# Patient Record
Sex: MALE | Race: WHITE | NOT HISPANIC OR LATINO | URBAN - METROPOLITAN AREA
[De-identification: names, ages, dates, MRNs, and addresses within clinical notes are randomized per-mention and may not be internally consistent; named-entity substitution may affect disease eponyms.]

---

## 2023-06-09 ENCOUNTER — EMERGENCY (EMERGENCY)
Facility: HOSPITAL | Age: 53
LOS: 1 days | Discharge: HOME | End: 2023-06-09
Attending: EMERGENCY MEDICINE | Admitting: EMERGENCY MEDICINE
Payer: COMMERCIAL

## 2023-06-09 VITALS
OXYGEN SATURATION: 100 % | HEART RATE: 125 BPM | WEIGHT: 235.01 LBS | HEIGHT: 68 IN | TEMPERATURE: 98 F | SYSTOLIC BLOOD PRESSURE: 130 MMHG | RESPIRATION RATE: 25 BRPM | DIASTOLIC BLOOD PRESSURE: 115 MMHG

## 2023-06-09 VITALS
RESPIRATION RATE: 19 BRPM | OXYGEN SATURATION: 98 % | HEART RATE: 85 BPM | SYSTOLIC BLOOD PRESSURE: 128 MMHG | TEMPERATURE: 99 F | DIASTOLIC BLOOD PRESSURE: 90 MMHG

## 2023-06-09 LAB
ANION GAP SERPL CALC-SCNC: 10 MMOL/L — SIGNIFICANT CHANGE UP (ref 5–17)
BUN SERPL-MCNC: 16 MG/DL — SIGNIFICANT CHANGE UP (ref 7–23)
CALCIUM SERPL-MCNC: 9.2 MG/DL — SIGNIFICANT CHANGE UP (ref 8.5–10.1)
CHLORIDE SERPL-SCNC: 108 MMOL/L — SIGNIFICANT CHANGE UP (ref 96–108)
CO2 SERPL-SCNC: 20 MMOL/L — LOW (ref 22–31)
CREAT SERPL-MCNC: 1.3 MG/DL — SIGNIFICANT CHANGE UP (ref 0.5–1.3)
EGFR: 66 ML/MIN/1.73M2 — SIGNIFICANT CHANGE UP
GLUCOSE SERPL-MCNC: 118 MG/DL — HIGH (ref 70–99)
HCT VFR BLD CALC: 48.7 % — SIGNIFICANT CHANGE UP (ref 39–50)
HGB BLD-MCNC: 16.4 G/DL — SIGNIFICANT CHANGE UP (ref 13–17)
MAGNESIUM SERPL-MCNC: 1.8 MG/DL — SIGNIFICANT CHANGE UP (ref 1.6–2.6)
MCHC RBC-ENTMCNC: 29.8 PG — SIGNIFICANT CHANGE UP (ref 27–34)
MCHC RBC-ENTMCNC: 33.7 GM/DL — SIGNIFICANT CHANGE UP (ref 32–36)
MCV RBC AUTO: 88.5 FL — SIGNIFICANT CHANGE UP (ref 80–100)
NRBC # BLD: 0 /100 WBCS — SIGNIFICANT CHANGE UP (ref 0–0)
PLATELET # BLD AUTO: 236 K/UL — SIGNIFICANT CHANGE UP (ref 150–400)
POTASSIUM SERPL-MCNC: 3.4 MMOL/L — LOW (ref 3.5–5.3)
POTASSIUM SERPL-SCNC: 3.4 MMOL/L — LOW (ref 3.5–5.3)
RBC # BLD: 5.5 M/UL — SIGNIFICANT CHANGE UP (ref 4.2–5.8)
RBC # FLD: 12.2 % — SIGNIFICANT CHANGE UP (ref 10.3–14.5)
SODIUM SERPL-SCNC: 138 MMOL/L — SIGNIFICANT CHANGE UP (ref 135–145)
WBC # BLD: 8.08 K/UL — SIGNIFICANT CHANGE UP (ref 3.8–10.5)
WBC # FLD AUTO: 8.08 K/UL — SIGNIFICANT CHANGE UP (ref 3.8–10.5)

## 2023-06-09 PROCEDURE — 99284 EMERGENCY DEPT VISIT MOD MDM: CPT

## 2023-06-09 PROCEDURE — 93010 ELECTROCARDIOGRAM REPORT: CPT | Mod: 76

## 2023-06-09 PROCEDURE — 93005 ELECTROCARDIOGRAM TRACING: CPT

## 2023-06-09 PROCEDURE — 80048 BASIC METABOLIC PNL TOTAL CA: CPT

## 2023-06-09 PROCEDURE — 85027 COMPLETE CBC AUTOMATED: CPT

## 2023-06-09 PROCEDURE — 83735 ASSAY OF MAGNESIUM: CPT

## 2023-06-09 PROCEDURE — 36415 COLL VENOUS BLD VENIPUNCTURE: CPT

## 2023-06-09 PROCEDURE — 99283 EMERGENCY DEPT VISIT LOW MDM: CPT | Mod: 25

## 2023-06-09 RX ORDER — POTASSIUM CHLORIDE 20 MEQ
40 PACKET (EA) ORAL ONCE
Refills: 0 | Status: COMPLETED | OUTPATIENT
Start: 2023-06-09 | End: 2023-06-09

## 2023-06-09 RX ADMIN — Medication 40 MILLIEQUIVALENT(S): at 15:52

## 2023-06-09 NOTE — ED PROVIDER NOTE - CARE PROVIDER_API CALL
Jack Govea  Cardiovascular Disease  [address], [place] [id number]  Phone: [phone]  Fax: [phone]  Follow Up Time:

## 2023-06-09 NOTE — ED PROVIDER NOTE - CLINICAL SUMMARY MEDICAL DECISION MAKING FREE TEXT BOX
Palpitations and this male with prior history of atrial fibrillation may have precipitated a bout of atrial fibrillation today from caffeinated intake here now requiring thorough evaluation EKG and labs as well as monitoring.

## 2023-06-09 NOTE — ED PROVIDER NOTE - PATIENT PORTAL LINK FT
may limit best corrected visual outcome after surgery. You can access the FollowMyHealth Patient Portal offered by [facility] by registering at the following website: [email or web address]. By joining [facility]’s FollowMyHealth portal, you will also be able to view your health information using other applications (apps) compatible with our system.

## 2023-06-09 NOTE — ED ADULT NURSE NOTE - NSFALLUNIVINTERV_ED_ALL_ED
Bed/Stretcher in lowest position, wheels locked, appropriate side rails in place/Call bell, personal items and telephone in reach/Instruct patient to call for assistance before getting out of bed/chair/stretcher/Non-slip footwear applied when patient is off stretcher/[place] to call system/Physically safe environment - no spills, clutter or unnecessary equipment/Purposeful proactive rounding/Room/bathroom lighting operational, light cord in reach

## 2023-06-09 NOTE — ED PROVIDER NOTE - OBJECTIVE STATEMENT
Patient is a 53-year-old white male with history of atrial fibrillation in the past status post electrical cardioversion presently on diltiazem presents today for evaluation of palpitations.  Patient states that he was in his usual state of health up until early morning he had a strong caffeinated coffee which he does not usually drink when he began to feel palpitations irregular and fast.  He felt slightly lightheaded and slightly short of breath but denied any chest pain nausea vomiting weakness.  Last episode even remotely similar to this was a few months ago when they attempted to increase the dose of his Synthroid.  At the present time patient is completely asymptomatic.

## 2023-06-09 NOTE — ED PROVIDER NOTE - DIFFERENTIAL DIAGNOSIS
Palpitations differential include anemia anxiety arrhythmia such as A-fib a flutter or some other dysrhythmia. Differential Diagnosis

## 2023-06-09 NOTE — ED ADULT NURSE NOTE - CHIEF COMPLAINT QUOTE
Pt with hx of Afib, on diltiazem 120mg, c/o palpitations, numbness and tingling on BL UE, lightheadedness, symptoms started an hr ago. Denies SOB, CP, N/V. Wife at bedside. Pt placed on monitor, EKG done. safety maintained, call bell within reach. Nursing care ongoing.

## 2023-06-09 NOTE — ED PROVIDER NOTE - NSFOLLOWUPINSTRUCTIONS_ED_ALL_ED_FT
Rest.  Increase fluid intake.  Limit/avoid caffeinated beverage intake.  Follow-up your primary care physician this week.  Return here if needed.

## 2023-06-09 NOTE — ED PROVIDER NOTE - PROGRESS NOTE DETAILS
First EKG performed here showed sinus tachycardia with with very brief bursts of A-fib a flutter lasting only for around 2 to 3 seconds.  Patient was placed on a cardiac monitor and left on a cardiac monitor for approximately 1 to 2 hours and remained in sinus rhythm repeat EKG was performed which showed normal sinus rhythm without any acute changes.

## 2025-05-26 NOTE — ED ADULT NURSE NOTE - DISCHARGE DATE/TIME
You will need to follow-up with your primary care physician regarding the cyst that was seen on your kidney.  The radiologist recommends you have a follow-up ultrasound.  A referral has been sent to the surgeons to help facilitate a follow-up appointment.  If you have increasing pain, develop a fever, or if you have any other concerning symptoms, please return to the ER immediately.  
09-Jun-2023 15:59